# Patient Record
Sex: FEMALE | Race: OTHER | URBAN - METROPOLITAN AREA
[De-identification: names, ages, dates, MRNs, and addresses within clinical notes are randomized per-mention and may not be internally consistent; named-entity substitution may affect disease eponyms.]

---
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
## 2023-05-26 RX ORDER — ALBUTEROL SULFATE 90 UG/1
1-2 AEROSOL, METERED RESPIRATORY (INHALATION) EVERY 6 HOURS PRN
COMMUNITY
Start: 2019-01-07

## 2023-05-26 RX ORDER — HYDROXYZINE HYDROCHLORIDE 10 MG/1
10 TABLET, FILM COATED ORAL 2 TIMES DAILY
COMMUNITY

## 2023-05-26 RX ORDER — ESCITALOPRAM OXALATE 20 MG/1
20 TABLET ORAL
COMMUNITY
Start: 2019-01-24

## 2023-05-26 RX ORDER — ZIPRASIDONE HYDROCHLORIDE 20 MG/1
20 CAPSULE ORAL 2 TIMES DAILY WITH MEALS
COMMUNITY

## 2023-05-26 RX ORDER — TOPIRAMATE 50 MG/1
100 TABLET, FILM COATED ORAL DAILY
COMMUNITY
Start: 2018-08-26

## 2023-05-26 RX ORDER — FLUTICASONE PROPIONATE 50 MCG
2 SPRAY, SUSPENSION (ML) NASAL DAILY
COMMUNITY
Start: 2018-11-26

## 2023-08-15 NOTE — PROGRESS NOTES
Randi Dubin is a 11 y.o.  female and presents with    Chief Complaint   Patient presents with    Vomiting    Diarrhea         Subjective:  Patient presents for vomiting and diarrhea x 2 days. She denies nausea, or abdominal pain, fever. She states she is having 8 loose stools a day right now and has vomited 3 times in 2 days. She has not tried anything to stop diarrhea. She is still eating she does not vomit after eating. She denies eating out recently, or eating questionable food, or travel. Current Outpatient Prescriptions   Medication Sig Dispense Refill    QUEtiapine (SEROQUEL) 200 mg tablet Take 200 mg by mouth nightly. Along with 50 mg to equal 250  1    QUEtiapine (SEROQUEL) 50 mg tablet Take 50 mg by mouth nightly. Along w/ 200 mg to equal 250 mg  0    escitalopram oxalate (LEXAPRO) 20 mg tablet Take 20 mg by mouth every morning. 1    escitalopram oxalate (LEXAPRO) 10 mg tablet Take 15 mg by mouth daily.  cholecalciferol, VITAMIN D3, (VITAMIN D3) 5,000 unit tab tablet Take 5,000 Units by mouth daily.  topiramate (TOPAMAX) 25 mg tablet Take 1 Tab by mouth two (2) times daily (with meals). 60 Tab 2    loratadine (CLARITIN) 10 mg tablet TAKE 1 TABLET BY MOUTH EVERY DAY 90 Tab 1    fluticasone (FLONASE) 50 mcg/actuation nasal spray 2 Sprays by Nasal route daily. 1 Bottle 3    mesalamine ER (APRISO) 0.375 gram 24 hour capsule Take 1.5 g by mouth daily.  dicyclomine (BENTYL) 20 mg tablet Take 20 mg by mouth every six (6) hours as needed.  azaTHIOprine (IMURAN) 50 mg tablet Take 1 Tab by mouth daily. 90 Tab 1    famotidine (PEPCID) 40 mg tablet Take 1 Tab by mouth daily. (Patient taking differently: Take 40 mg by mouth daily as needed.) 90 Tab 1    QUEtiapine (SEROQUEL) 100 mg tablet Take 100 mg by mouth nightly.        Allergies   Allergen Reactions    Shellfish Derived Anaphylaxis    Other Plant, Animal, Environmental Hives and [employer], [place], mold & pollen        Review of Systems   Constitutional: Negative for fever and weight loss. Gastrointestinal: Positive for diarrhea and vomiting. Negative for abdominal pain, blood in stool, constipation, heartburn, melena and nausea. All other systems reviewed and are negative. Objective:  Vitals:    09/18/18 1322   BP: 100/64   Pulse: 92   Resp: 16   Temp: 98.7 °F (37.1 °C)   TempSrc: Oral   SpO2: 98%   Weight: 111 lb 3.2 oz (50.4 kg)   Height: 5' 2.9\" (1.598 m)   PainSc:   4   LMP: 09/06/2018     General:   Well-groomed, well-nourished, in no distress, pleasant, alert, appropriate    Abdomen:   Abdomen soft, normal bowel sounds. No masses, tenderness,    rebound/rigidity or CVA tenderness. No hepatosplenomegaly. Assessment/Plan:      1. Non-intractable vomiting without nausea, unspecified vomiting type  2. Diarrhea, unspecified type  Stay hydrated use small amounts of water and gatorade for electrolytes. Liquid diet for next 24 hours advance as tolerated and bland diet for next couple of days. Symptoms should resolve 5-7 days if not doing better rtc as needed. I have discussed the diagnosis with the patient and the intended plan as seen in the above orders. The patient has received an after-visit summary and questions were answered concerning future plans. I have discussed medication side effects and warnings with the patient as well. I have reviewed the plan of care with the patient, accepted their input and they are in agreement with the treatment goals. Follow-up Disposition:  Return if symptoms worsen or fail to improve. More than 1/2 of this 30 minute visit was spent in counselling and coordination of care, as described above.     Brittny Landry [facility] 2 = difficulty understanding and speaking (not related to language barrier)